# Patient Record
Sex: FEMALE | URBAN - METROPOLITAN AREA
[De-identification: names, ages, dates, MRNs, and addresses within clinical notes are randomized per-mention and may not be internally consistent; named-entity substitution may affect disease eponyms.]

---

## 2020-07-30 ENCOUNTER — TRANSFERRED RECORDS (OUTPATIENT)
Dept: HEALTH INFORMATION MANAGEMENT | Facility: CLINIC | Age: 80
End: 2020-07-30

## 2020-08-11 ENCOUNTER — TRANSFERRED RECORDS (OUTPATIENT)
Dept: HEALTH INFORMATION MANAGEMENT | Facility: CLINIC | Age: 80
End: 2020-08-11

## 2020-08-18 ENCOUNTER — TRANSFERRED RECORDS (OUTPATIENT)
Dept: HEALTH INFORMATION MANAGEMENT | Facility: CLINIC | Age: 80
End: 2020-08-18

## 2020-08-21 ENCOUNTER — TRANSFERRED RECORDS (OUTPATIENT)
Dept: HEALTH INFORMATION MANAGEMENT | Facility: CLINIC | Age: 80
End: 2020-08-21

## 2020-08-25 ENCOUNTER — TRANSFERRED RECORDS (OUTPATIENT)
Dept: HEALTH INFORMATION MANAGEMENT | Facility: CLINIC | Age: 80
End: 2020-08-25

## 2020-08-27 ENCOUNTER — MEDICAL CORRESPONDENCE (OUTPATIENT)
Dept: HEALTH INFORMATION MANAGEMENT | Facility: CLINIC | Age: 80
End: 2020-08-27

## 2020-08-27 ENCOUNTER — TRANSCRIBE ORDERS (OUTPATIENT)
Dept: OTHER | Age: 80
End: 2020-08-27

## 2020-08-27 DIAGNOSIS — M79.89 MASS OF SOFT TISSUE OF PELVIS: Primary | ICD-10-CM

## 2020-08-27 DIAGNOSIS — M70.72 ILIOPSOAS BURSITIS OF LEFT HIP: ICD-10-CM

## 2020-08-28 ENCOUNTER — TELEPHONE (OUTPATIENT)
Dept: ORTHOPEDICS | Facility: CLINIC | Age: 80
End: 2020-08-28

## 2020-08-28 NOTE — TELEPHONE ENCOUNTER
[facility] Call Center    Phone Message    May a detailed message be left on voicemail: yes     Reason for Call: [facility] pt scheduled but in a lot of pain.  Is having a transportation issue for Thursday, can she come Monday - Wednesday instead?  Also, pt is asking to stay there and have procedure at same visit as she will be traveling to get there. She is elderly and alone.  Please call her to answer her questions.    Action Taken: Message routed to:  Adult Clinics: Orthopedics p [id number] and Other: Ortho  Please call pt.    Travel Screening: Not Applicable Not applicable.                                                                         [facility] CLINICAL DOCUMENTATION

## 2020-08-28 NOTE — TELEPHONE ENCOUNTER
Called and told pt that  only sees pt in person on thrusdays, [place] so we cannot do virtual visits. She is going to try to find a ride an dif not she will call on Monday to reschedule to 9/10/2020.

## 2020-08-31 NOTE — TELEPHONE ENCOUNTER
RECORDS RECEIVED FROM: mass of soft tissue of pelvis/iliopsoas bursitis of L hip/Dr. Duncan/Medicare/MRI at [facility] on 8/21/20/ortho con   DATE RECEIVED: Sep 3, 2020     NOTES STATUS DETAILS   OFFICE NOTE from referring provider In process    OFFICE NOTE from other specialist N/A    DISCHARGE SUMMARY from hospital N/A    DISCHARGE REPORT from the ER N/A    OPERATIVE REPORT N/A    MEDICATION LIST Internal    IMPLANT RECORD/STICKER N/A    LABS     CBC/DIFF N/A    CULTURES N/A    INJECTIONS DONE IN RADIOLOGY N/A    MRI N/A    CT SCAN N/A    XRAYS (IMAGES & REPORTS) In process    TUMOR     PATHOLOGY  Slides & report N/A    08/31/20   8:30 AM   Request sent to [place] for records and images  Tasha Estevez CMA

## 2020-09-03 ENCOUNTER — PRE VISIT (OUTPATIENT)
Dept: ORTHOPEDICS | Facility: CLINIC | Age: 80
End: 2020-09-03